# Patient Record
Sex: FEMALE | Race: ASIAN | NOT HISPANIC OR LATINO | Employment: OTHER | URBAN - METROPOLITAN AREA
[De-identification: names, ages, dates, MRNs, and addresses within clinical notes are randomized per-mention and may not be internally consistent; named-entity substitution may affect disease eponyms.]

---

## 2018-01-29 ENCOUNTER — OFFICE VISIT (OUTPATIENT)
Dept: FAMILY MEDICINE | Facility: CLINIC | Age: 83
End: 2018-01-29
Payer: MEDICARE

## 2018-01-29 ENCOUNTER — HOSPITAL ENCOUNTER (OUTPATIENT)
Dept: RADIOLOGY | Facility: HOSPITAL | Age: 83
Discharge: HOME | End: 2018-01-29
Attending: FAMILY MEDICINE
Payer: MEDICARE

## 2018-01-29 DIAGNOSIS — Z00.00 PREVENTATIVE HEALTH CARE: ICD-10-CM

## 2018-01-29 DIAGNOSIS — R00.0 TACHYCARDIA: ICD-10-CM

## 2018-01-29 DIAGNOSIS — R05.9 COUGH: ICD-10-CM

## 2018-01-29 DIAGNOSIS — I10 ESSENTIAL HYPERTENSION: Primary | ICD-10-CM

## 2018-01-29 DIAGNOSIS — F03.90 DEMENTIA WITHOUT BEHAVIORAL DISTURBANCE, UNSPECIFIED DEMENTIA TYPE: ICD-10-CM

## 2018-01-29 DIAGNOSIS — N39.0 RECURRENT UTI: ICD-10-CM

## 2018-01-29 LAB
BILIRUB UR QL STRIP: NEGATIVE
CLARITY UR REFRACT.AUTO: CLEAR
COLOR UR AUTO: ABNORMAL
GLUCOSE UR QL STRIP: NEGATIVE
HGB UR QL STRIP: NEGATIVE
HYALINE CASTS UR QL AUTO: 1 /LPF
KETONES UR QL STRIP: NEGATIVE
LEUKOCYTE ESTERASE UR QL STRIP: ABNORMAL
MICROSCOPIC COMMENT: NORMAL
NITRITE UR QL STRIP: NEGATIVE
PH UR STRIP: 6 [PH] (ref 5–8)
PROT UR QL STRIP: NEGATIVE
RBC #/AREA URNS AUTO: 0 /HPF (ref 0–4)
SP GR UR STRIP: 1.01 (ref 1–1.03)
SQUAMOUS #/AREA URNS AUTO: 0 /HPF
URN SPEC COLLECT METH UR: ABNORMAL
UROBILINOGEN UR STRIP-ACNC: NEGATIVE EU/DL
WBC #/AREA URNS AUTO: 2 /HPF (ref 0–5)

## 2018-01-29 PROCEDURE — 99204 OFFICE O/P NEW MOD 45 MIN: CPT | Mod: S$PBB,,, | Performed by: FAMILY MEDICINE

## 2018-01-29 PROCEDURE — 71046 X-RAY EXAM CHEST 2 VIEWS: CPT | Mod: TC,FY,PO

## 2018-01-29 PROCEDURE — 1159F MED LIST DOCD IN RCRD: CPT | Mod: ,,, | Performed by: FAMILY MEDICINE

## 2018-01-29 PROCEDURE — 87086 URINE CULTURE/COLONY COUNT: CPT

## 2018-01-29 PROCEDURE — 81001 URINALYSIS AUTO W/SCOPE: CPT

## 2018-01-29 PROCEDURE — 93005 ELECTROCARDIOGRAM TRACING: CPT | Mod: PBBFAC,PO | Performed by: FAMILY MEDICINE

## 2018-01-29 PROCEDURE — 1126F AMNT PAIN NOTED NONE PRSNT: CPT | Mod: ,,, | Performed by: FAMILY MEDICINE

## 2018-01-29 PROCEDURE — 71046 X-RAY EXAM CHEST 2 VIEWS: CPT | Mod: 26,,, | Performed by: RADIOLOGY

## 2018-01-29 PROCEDURE — 99999 PR PBB SHADOW E&M-NEW PATIENT-LVL IV: CPT | Mod: PBBFAC,,, | Performed by: FAMILY MEDICINE

## 2018-01-29 PROCEDURE — 99204 OFFICE O/P NEW MOD 45 MIN: CPT | Mod: PBBFAC,25,PO | Performed by: FAMILY MEDICINE

## 2018-01-29 PROCEDURE — 93010 ELECTROCARDIOGRAM REPORT: CPT | Mod: ,,, | Performed by: NUCLEAR MEDICINE

## 2018-01-29 RX ORDER — MEMANTINE HYDROCHLORIDE 28 MG/1
CAPSULE, EXTENDED RELEASE ORAL
COMMUNITY
End: 2018-02-05 | Stop reason: SDUPTHER

## 2018-01-29 RX ORDER — AMLODIPINE BESYLATE 5 MG/1
5 TABLET ORAL DAILY
COMMUNITY
End: 2019-02-01 | Stop reason: SDUPTHER

## 2018-01-29 NOTE — PROGRESS NOTES
CHIEF COMPLAINT: This is a 89-year-old female here for first visit to establish care and complaining of persistent cough.    SUBJECTIVE: The patient is brought in by her son and daughter-in-law.  She has a history of essential hypertension for which she takes amlodipine 5 mg daily.  Blood pressure fluctuates and record is brought in, which shows recent normal readings in the last week.  Blood pressure today is 150/86.  Repeated by me 138/80.  Pulse was initially 114.  Repeated by me 92.  Patient has dementia and takes Namenda XR 28 mg daily.  Her family reports that she's had recurrent UTI and is dissatisfied with workup.  Apparently PCP has been treating her with antibiotics for subjective symptoms.  She recently completed a round of Levaquin after a course of cephalexin.  She is incontinent of urine recently and daughter-in-law complains of strong odor.  Patient denies hematuria or dysuria.    Patient was recently ill with upper respiratory infection.  She was given Tamiflu.  Following that, she had wheezing and was given oral steroids.  Patient continues to have cough, but it is much improved.  Her grandson is a cardiologist and requests chest x-ray and EKG.  The patient ambulates with a cane.  She attends to her own bathing and toileting, but does not cook or do housework.  Her financial decisions are made by her family.      Past Medical History:   Diagnosis Date    Dementia     Essential hypertension     Recurrent UTI        Past Surgical History:   Procedure Laterality Date    TOTAL KNEE ARTHROPLASTY Right 2002       Social History     Social History    Marital status:      Spouse name: N/A    Number of children: N/A    Years of education: N/A     Social History Main Topics    Smoking status: Never Smoker    Smokeless tobacco: Never Used    Alcohol use No    Drug use: No    Sexual activity: Not Asked     Other Topics Concern    None     Social History Narrative    She is  and has 3  adult children.  She lives with her son and daughter-in-law.       Family History   Problem Relation Age of Onset    No Known Problems Mother     Other Father      Accident     Dementia Sister     Heart disease Brother     Heart disease Brother     Heart disease Brother     Lumbar disc disease Son          ROS:  GENERAL: Patient denies fever, chills, night sweats.  Patient denies weight gain or loss. Patient denies anorexia, fatigue, weakness or swollen glands.  SKIN: Patient denies rash or hair loss.  HEENT: Patient denies sore throat, ear pain, hearing loss, nasal congestion, or runny nose. Patient denies visual disturbance, eye irritation or discharge.  LUNGS: Patient denies wheeze or hemoptysis.  Positive for cough.  CARDIOVASCULAR: Patient denies chest pain, shortness of breath, palpitations, syncope or lower extremity edema.  GI: Patient denies abdominal pain, nausea, vomiting, diarrhea, constipation, blood in stool or melena.  GENITOURINARY: Patient denies pelvic pain, vaginal discharge, itch or odor. Patient denies irregular vaginal bleeding.  Patient denies dysuria, frequency, hematuria, or urgency.  Positive for urinary incontinence.  BREASTS: Patient denies breast pain, mass or nipple discharge.  MUSCULOSKELETAL: Patient denies joint pain, swelling, redness or warmth.  NEUROLOGIC: Patient denies headache, vertigo, paresthesias, weakness in limb, dysarthria, dysphagia or abnormality of gait.  PSYCHIATRIC: Patient denies anxiety, depression, or memory loss.    OBJECTIVE:   VITAL SIGNS: O2 saturation 97%.  GENERAL: Well-developed well-nourished elderly, slightly overweight female alert and oriented x3, in no acute distress.  Poor historian due to to cognitive impairment.  SKIN: Clear without rash.  Normal color and tone.  HEENT: Eyes: Clear conjunctivae. No scleral icterus.  Pupils equal reactive to light and accommodation.  Ears: Clear canals. Clear TMs.  Nose: Without congestion.  Pharynx: Without  injection or exudates.  NECK: Supple, normal range of motion.  No masses, lymphadenopathy or enlarged thyroid.  No JVD.  Carotids 2+ and equal.  No bruits.  LUNGS: Clear to auscultation.  Normal respiratory effort.  CARDIOVASCULAR:  Regular rhythm with occasional irregular beats, normal S1, S2 without murmur, gallop or rub.  BACK:  No CVA or spinal tenderness.  ABDOMEN: Normal appearance.  Active bowel sounds.  Soft, nontender without mass or organomegaly.  No rebound or guarding.  EXTREMITIES: Without cyanosis, clubbing or edema.  Distal pulses 2+ and equal.  Normal range of motion in all extremities.  No joint effusion, erythema or warmth.  NEUROLOGIC:   Cranial nerves II through XII without deficit.  Motor strength equal bilaterally.  Sensation normal to touch.  Deep tendon reflexes 2+ and equal.  Gait unsteady without support.  No tremor.  Negative cerebellar signs.    EKG: Sinus rhythm.  Right bundle branch block.  Chest x-ray:  Cardiac silhouette appears borderline enlarged.   Diffuse interstitial hazy parenchymal opacities are present throughout the bilateral lungs which may be in part chronic in nature.  No prior exams available for comparison.  Multifocal infectious/inflammatory process or developing pulmonary edema not excluded.  Clinical correlation is recommended. Multilevel degenerative changes noted throughout the visualized spine.    ASSESSMENT:  1. Essential hypertension    2. Dementia without behavioral disturbance, unspecified dementia type    3. Recurrent UTI    4. Cough    5. Tachycardia    6. Preventative health care      PLAN:   1.  Urinalysis and urine culture and sensitivity.  2.  Fasting lab.  3.  Continue amlodipine 5 mg daily.  Monitor blood pressure.  Report readings greater than or equal to 140/90.  4.  Follow-up if no resolution of cough or worsening symptoms.  Consider pulmonary consult.  5.  Refill Namenda.  6.  Follow-up in 6 months.

## 2018-01-30 ENCOUNTER — TELEPHONE (OUTPATIENT)
Dept: FAMILY MEDICINE | Facility: CLINIC | Age: 83
End: 2018-01-30

## 2018-01-31 LAB — BACTERIA UR CULT: NORMAL

## 2018-02-04 VITALS
SYSTOLIC BLOOD PRESSURE: 138 MMHG | WEIGHT: 112.88 LBS | HEIGHT: 55 IN | RESPIRATION RATE: 18 BRPM | BODY MASS INDEX: 26.12 KG/M2 | DIASTOLIC BLOOD PRESSURE: 80 MMHG | OXYGEN SATURATION: 97 % | HEART RATE: 92 BPM | TEMPERATURE: 99 F

## 2018-02-05 ENCOUNTER — PATIENT MESSAGE (OUTPATIENT)
Dept: FAMILY MEDICINE | Facility: CLINIC | Age: 83
End: 2018-02-05

## 2018-02-05 RX ORDER — MEMANTINE HYDROCHLORIDE 28 MG/1
1 CAPSULE, EXTENDED RELEASE ORAL DAILY
Qty: 90 CAPSULE | Refills: 1 | Status: SHIPPED | OUTPATIENT
Start: 2018-02-05 | End: 2018-08-03 | Stop reason: SDUPTHER

## 2018-08-03 RX ORDER — MEMANTINE HYDROCHLORIDE 28 MG/1
1 CAPSULE, EXTENDED RELEASE ORAL DAILY
Qty: 90 CAPSULE | Refills: 1 | Status: SHIPPED | OUTPATIENT
Start: 2018-08-03 | End: 2019-02-01 | Stop reason: SDUPTHER

## 2018-08-03 NOTE — TELEPHONE ENCOUNTER
----- Message from Vicky Chapito sent at 8/3/2018  9:42 AM CDT -----  Contact: pt   1. What is the name of the medication you are requesting? memantine (NAMENDA XR)        2. What is the dose? 28 mg CSpX  3. How do you take the medication? Orally, topically, etc?oral   4. How often do you take this medication? daily  5. Do you need a 30 day or 90 day supply? 30  6. How many refills are you requesting? 1  7. What is your preferred pharmacy and location of the pharmacy?   [facility] [address] [place] [address] [place] [id number] [facility]  [address] [address]  Phone: [phone] Fax: [phone]  8. Who can we contact with further questions? the patient

## 2019-01-22 ENCOUNTER — OFFICE VISIT (OUTPATIENT)
Dept: FAMILY MEDICINE | Facility: CLINIC | Age: 84
End: 2019-01-22
Payer: MEDICARE

## 2019-01-22 VITALS
WEIGHT: 112.88 LBS | SYSTOLIC BLOOD PRESSURE: 130 MMHG | HEIGHT: 55 IN | BODY MASS INDEX: 26.12 KG/M2 | DIASTOLIC BLOOD PRESSURE: 86 MMHG | TEMPERATURE: 98 F | HEART RATE: 88 BPM | OXYGEN SATURATION: 97 %

## 2019-01-22 DIAGNOSIS — I10 ESSENTIAL HYPERTENSION: ICD-10-CM

## 2019-01-22 DIAGNOSIS — F03.90 DEMENTIA WITHOUT BEHAVIORAL DISTURBANCE, UNSPECIFIED DEMENTIA TYPE: Primary | ICD-10-CM

## 2019-01-22 DIAGNOSIS — R44.3 HALLUCINATIONS: ICD-10-CM

## 2019-01-22 PROCEDURE — 99214 OFFICE O/P EST MOD 30 MIN: CPT | Mod: S$PBB,,, | Performed by: FAMILY MEDICINE

## 2019-01-22 PROCEDURE — 99999 PR PBB SHADOW E&M-EST. PATIENT-LVL III: ICD-10-PCS | Mod: PBBFAC,,, | Performed by: FAMILY MEDICINE

## 2019-01-22 PROCEDURE — 99213 OFFICE O/P EST LOW 20 MIN: CPT | Mod: PBBFAC,PO | Performed by: FAMILY MEDICINE

## 2019-01-22 PROCEDURE — 99214 PR OFFICE/OUTPT VISIT, EST, LEVL IV, 30-39 MIN: ICD-10-PCS | Mod: S$PBB,,, | Performed by: FAMILY MEDICINE

## 2019-01-22 PROCEDURE — 99999 PR PBB SHADOW E&M-EST. PATIENT-LVL III: CPT | Mod: PBBFAC,,, | Performed by: FAMILY MEDICINE

## 2019-01-22 RX ORDER — FERROUS SULFATE, DRIED 160(50) MG
1 TABLET, EXTENDED RELEASE ORAL 2 TIMES DAILY WITH MEALS
COMMUNITY

## 2019-01-22 RX ORDER — DONEPEZIL HYDROCHLORIDE 5 MG/1
5 TABLET, FILM COATED ORAL NIGHTLY
Qty: 30 TABLET | Refills: 3 | Status: SHIPPED | OUTPATIENT
Start: 2019-01-22 | End: 2020-07-17

## 2019-01-22 NOTE — PROGRESS NOTES
CHIEF COMPLAINT:  This is a 89-year-old female here for follow-up dementia.    SUBJECTIVE:  The patient is brought in today by her son and daughter-in-law because of hallucinations.  She carries on conversations while no one is present. Hallucinations are not disruptive or cause behavioral changes.  Patient is currently taking Namenda XR 28 mg daily.  Her son wonders if there is any other medication that would help her but on the other hand does not like to give her medication.  For example she has hypertension and is supposed to take amlodipine 5 mg daily but he gives the medication intermittently.  Blood pressure today is 130/86.  The patient ambulates with a cane.  She attends to her own bathing and toileting, but does not cook or do housework.  Her financial decisions are made by her family.    ROS:  GENERAL: Patient denies fever, chills, night sweats.  Patient denies weight gain or loss. Patient denies anorexia, fatigue, weakness or swollen glands.  SKIN: Patient denies rash or hair loss.  HEENT: Patient denies sore throat, ear pain, hearing loss, nasal congestion, or runny nose. Patient denies visual disturbance, eye irritation or discharge.  LUNGS: Patient denies wheeze or hemoptysis.  Positive for cough.  CARDIOVASCULAR: Patient denies chest pain, shortness of breath, palpitations, syncope or lower extremity edema.  GI: Patient denies abdominal pain, nausea, vomiting, diarrhea, constipation, blood in stool or melena.  GENITOURINARY: Patient denies pelvic pain, vaginal discharge, itch or odor. Patient denies irregular vaginal bleeding.  Patient denies dysuria, frequency, hematuria, or urgency.  Positive for urinary incontinence.  BREASTS: Patient denies breast pain, mass or nipple discharge.  MUSCULOSKELETAL: Patient denies joint pain, swelling, redness or warmth.  NEUROLOGIC: Patient denies headache, vertigo, paresthesias, weakness in limb, dysarthria, dysphagia or abnormality of gait.  PSYCHIATRIC: Patient  denies anxiety, depression or insomnia.     OBJECTIVE:   VITAL SIGNS: O2 saturation 97%.  GENERAL: Well-developed well-nourished elderly, slightly overweight female alert and oriented x3, in no acute distress.  Poor historian due to to cognitive impairment.  SKIN: Clear without rash.  Normal color and tone.  HEENT: Eyes: Clear conjunctivae. No scleral icterus.  Pupils equal reactive to light and accommodation.  Ears: Clear canals. Clear TMs.  Nose: Without congestion.  Pharynx: Without injection or exudates.  NECK: Supple, normal range of motion.  No masses, lymphadenopathy or enlarged thyroid.  No JVD.  Carotids 2+ and equal.  No bruits.  LUNGS: Clear to auscultation.  Normal respiratory effort.  CARDIOVASCULAR:  Regular rhythm with occasional irregular beats, normal S1, S2 without murmur, gallop or rub.  BACK:  No CVA or spinal tenderness.  ABDOMEN: Soft, nontender without mass or organomegaly.  No rebound or guarding.  EXTREMITIES: Without cyanosis, clubbing or edema.  Distal pulses 2+ and equal.  Normal range of motion in all extremities.  No joint effusion, erythema or warmth.  NEUROLOGIC:  Gait unsteady without support.  No tremor.      Discussed at length the treatment of dementia and medications used to control disturbances of behavior.  Reviewed potential side effects.    ASSESSMENT:  1. Dementia without behavioral disturbance, unspecified dementia type    2. Hallucinations    3. Essential hypertension      PLAN:   1.  Since patient is having no significant behavioral disturbances while talking to people who are not there, psychotropic medications are not indicated in view of potential side effects.  2.  Start Aricept 5 mg nightly.  Increase dose to 10 mg in 1 month if no side effects.  3.  Reassurance.  4.  Follow up if no improvement or worsening symptoms.    This visit was 30 min, greater than 50% of which involved counseling.

## 2019-01-30 ENCOUNTER — TELEPHONE (OUTPATIENT)
Dept: FAMILY MEDICINE | Facility: CLINIC | Age: 84
End: 2019-01-30

## 2019-01-30 NOTE — TELEPHONE ENCOUNTER
Pt son states that his mother is having muscle pain in her back and increased confusion with taking Aricept. Pt son notified to stop medication until further directions are given from provider. Please advise.

## 2019-01-30 NOTE — TELEPHONE ENCOUNTER
----- Message from Afshan Juarez sent at 1/30/2019  1:25 PM CST -----  Contact: Patient's son, Ace Bello needs to speak to nurse about patients medication, stating she is having some effects of medication, please call back at [phone]. Thank you

## 2019-01-31 ENCOUNTER — PATIENT MESSAGE (OUTPATIENT)
Dept: FAMILY MEDICINE | Facility: CLINIC | Age: 84
End: 2019-01-31

## 2019-01-31 NOTE — TELEPHONE ENCOUNTER
Spoke with son and advised to discontinue medications and discuss at appt next week. Verbalized understanding. Call ended well.

## 2019-02-01 RX ORDER — AMLODIPINE BESYLATE 5 MG/1
5 TABLET ORAL DAILY
Qty: 30 TABLET | Refills: 5 | Status: SHIPPED | OUTPATIENT
Start: 2019-02-01 | End: 2019-11-21 | Stop reason: SDUPTHER

## 2019-02-01 RX ORDER — MEMANTINE HYDROCHLORIDE 28 MG/1
1 CAPSULE, EXTENDED RELEASE ORAL DAILY
Qty: 90 CAPSULE | Refills: 1 | Status: SHIPPED | OUTPATIENT
Start: 2019-02-01 | End: 2019-07-29 | Stop reason: SDUPTHER

## 2019-02-08 ENCOUNTER — HOSPITAL ENCOUNTER (OUTPATIENT)
Dept: RADIOLOGY | Facility: HOSPITAL | Age: 84
Discharge: HOME | End: 2019-02-08
Attending: FAMILY MEDICINE
Payer: MEDICARE

## 2019-02-08 ENCOUNTER — OFFICE VISIT (OUTPATIENT)
Dept: FAMILY MEDICINE | Facility: CLINIC | Age: 84
End: 2019-02-08
Payer: MEDICARE

## 2019-02-08 VITALS
HEIGHT: 55 IN | DIASTOLIC BLOOD PRESSURE: 108 MMHG | BODY MASS INDEX: 27.73 KG/M2 | RESPIRATION RATE: 18 BRPM | HEART RATE: 64 BPM | TEMPERATURE: 98 F | OXYGEN SATURATION: 97 % | SYSTOLIC BLOOD PRESSURE: 162 MMHG

## 2019-02-08 DIAGNOSIS — S39.92XA INJURY OF BACK, INITIAL ENCOUNTER: ICD-10-CM
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
## 2019-02-08 DIAGNOSIS — S39.92XA INJURY OF BACK, INITIAL ENCOUNTER: Primary | ICD-10-CM

## 2019-02-08 DIAGNOSIS — S89.92XA INJURY OF LEFT KNEE, INITIAL ENCOUNTER: ICD-10-CM

## 2019-02-08 DIAGNOSIS — S79.912A INJURY OF LEFT HIP, INITIAL ENCOUNTER: ICD-10-CM

## 2019-02-08 DIAGNOSIS — F03.90 DEMENTIA WITHOUT BEHAVIORAL DISTURBANCE, UNSPECIFIED DEMENTIA TYPE: ICD-10-CM

## 2019-02-08 PROBLEM — Z89.622: Status: ACTIVE | Noted: 2019-02-08

## 2019-02-08 PROCEDURE — 73560 XR KNEE ORTHO LEFT: ICD-10-PCS | Mod: 26,59,LT, | Performed by: RADIOLOGY

## 2019-02-08 PROCEDURE — 72070 X-RAY EXAM THORAC SPINE 2VWS: CPT | Mod: TC,FY,PO

## 2019-02-08 PROCEDURE — 99999 PR PBB SHADOW E&M-EST. PATIENT-LVL IV: CPT | Mod: PBBFAC,,, | Performed by: FAMILY MEDICINE

## 2019-02-08 PROCEDURE — 73502 XR HIP 2 VIEW LEFT: ICD-10-PCS | Mod: 26,LT,, | Performed by: RADIOLOGY

## 2019-02-08 PROCEDURE — 73562 XR KNEE ORTHO LEFT: ICD-10-PCS | Mod: 26,LT,, | Performed by: RADIOLOGY

## 2019-02-08 PROCEDURE — 99214 PR OFFICE/OUTPT VISIT, EST, LEVL IV, 30-39 MIN: ICD-10-PCS | Mod: S$PBB,,, | Performed by: FAMILY MEDICINE

## 2019-02-08 PROCEDURE — 73560 X-RAY EXAM OF KNEE 1 OR 2: CPT | Mod: TC,FY,PO,LT

## 2019-02-08 PROCEDURE — 99214 OFFICE O/P EST MOD 30 MIN: CPT | Mod: PBBFAC,25,PO | Performed by: FAMILY MEDICINE

## 2019-02-08 PROCEDURE — 72070 X-RAY EXAM THORAC SPINE 2VWS: CPT | Mod: 26,,, | Performed by: RADIOLOGY

## 2019-02-08 PROCEDURE — 73560 X-RAY EXAM OF KNEE 1 OR 2: CPT | Mod: 26,59,LT, | Performed by: RADIOLOGY

## 2019-02-08 PROCEDURE — 99999 PR PBB SHADOW E&M-EST. PATIENT-LVL IV: ICD-10-PCS | Mod: PBBFAC,,, | Performed by: FAMILY MEDICINE

## 2019-02-08 PROCEDURE — 72070 XR THORACIC SPINE AP LATERAL: ICD-10-PCS | Mod: 26,,, | Performed by: RADIOLOGY

## 2019-02-08 PROCEDURE — 73502 X-RAY EXAM HIP UNI 2-3 VIEWS: CPT | Mod: 26,LT,, | Performed by: RADIOLOGY

## 2019-02-08 PROCEDURE — 73502 X-RAY EXAM HIP UNI 2-3 VIEWS: CPT | Mod: TC,FY,PO,LT

## 2019-02-08 PROCEDURE — 99214 OFFICE O/P EST MOD 30 MIN: CPT | Mod: S$PBB,,, | Performed by: FAMILY MEDICINE

## 2019-02-08 PROCEDURE — 73562 X-RAY EXAM OF KNEE 3: CPT | Mod: 26,LT,, | Performed by: RADIOLOGY

## 2019-02-08 RX ORDER — DICLOFENAC SODIUM 10 MG/G
2 GEL TOPICAL 4 TIMES DAILY
Qty: 1 TUBE | Refills: 5 | Status: SHIPPED | OUTPATIENT
Start: 2019-02-08 | End: 2020-05-26

## 2019-02-08 NOTE — PROGRESS NOTES
CHIEF COMPLAINT:  This is a 89-year-old female complaining of injury sustained in a fall.    SUBJECTIVE:  The patient is brought in by her son and daughter-in-law.  She fell yesterday onto the bathroom floor during the night when she got out of bed on her own.  Since that time, she has been complaining of pain in her midback.  She is also having difficulty bearing weight on her left leg.  Patient had 2 prior falls onto the carpet preceding yesterday's fall.  Her son is concerned about her getting out of bed on her own during the night.  He and his wife are not able to sleep because of the situation.  He requests a hospital bed with railing.  The patient has dementia.  She carries on conversations while no one is present. Hallucinations are not disruptive or cause behavioral changes.  Patient is currently taking Namenda XR 28 mg daily.  Patient was started on Aricept 5 mg approximately 2-1/2 weeks ago but was unable to tolerate medication.  Her family states that she became more confused and complained of back pain so Aricept was discontinued. Patient has been given Tylenol extra-strength with some relief.    ROS:  GENERAL: Patient denies fever, chills, night sweats.  Patient denies weight gain or loss. Patient denies anorexia, fatigue, weakness or swollen glands.  SKIN: Patient denies rash or hair loss.  HEENT: Patient denies sore throat, ear pain, hearing loss, nasal congestion, or runny nose. Patient denies visual disturbance, eye irritation or discharge.  LUNGS: Patient denies cough, wheeze or hemoptysis.    CARDIOVASCULAR: Patient denies chest pain, shortness of breath, palpitations, syncope or lower extremity edema.  GI: Patient denies abdominal pain, nausea, vomiting, diarrhea, constipation, blood in stool or melena.  GENITOURINARY: Patient denies dysuria, frequency, hematuria, or urgency.  Positive for urinary incontinence.  MUSCULOSKELETAL: Patient denies joint pain, swelling, redness or warmth.  NEUROLOGIC:  Patient denies headache, vertigo, paresthesias, weakness in limb, dysarthria, dysphagia or abnormality of gait.  PSYCHIATRIC: Patient denies anxiety, depression or insomnia.     OBJECTIVE:   GENERAL: Well-developed well-nourished elderly, slightly overweight female alert and oriented x3, in no acute distress.  Poor historian due to to cognitive impairment.  SKIN: Clear without rash.  Normal color and tone.  No abrasions or contusions noted.  HEENT: Eyes: Clear conjunctivae. No scleral icterus.  Pupils equal reactive to light and accommodation.  Ears: Clear canals. Clear TMs.  Nose: Without congestion.  Pharynx: Without injection or exudates.  NECK: Supple, normal range of motion.  No masses, lymphadenopathy or enlarged thyroid.  No JVD.  Carotids 2+ and equal.  No bruits.  LUNGS: Clear to auscultation.  Normal respiratory effort.  CARDIOVASCULAR:  Regular rhythm with occasional irregular beats, normal S1, S2 without murmur, gallop or rub.  BACK:  No CVA or spinal tenderness. No paraspinous muscle tenderness.  ABDOMEN: Soft, nontender without mass or organomegaly.  No rebound or guarding.  EXTREMITIES: Without cyanosis, clubbing or edema.  Distal pulses 2+ and equal.  No joint effusion, erythema or warmth.  NEUROLOGIC:  Patient unable to stand on both legs even with assistance.  Gait unsteady without support.  No tremor.      X-ray thoracic spine:  There are multilevel mild age-indeterminate compression fracture deformities throughout the thoracic spine with involvement of the T4 and T9 through T12 levels.  Multilevel spondylosis and mild disc height loss is present throughout the thoracic spine as well as the visualized cervical spine.  There is mild S shaped thoracolumbar scoliosis with convexity of the thoracic spine to the right.  No spondylolisthesis demonstrated.  Generalized osteopenia noted.  X-ray left knee:  There is no radiographic evidence of acute osseous, articular, or soft tissue abnormality.  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  Prior right knee arthroplasty noted.  X-ray left hip:  There is no radiographic evidence of acute osseous, articular, or soft tissue abnormality.  Hip joint spaces are preserved.  Degenerative findings noted in the visualized lower lumbar spine and bilateral SI joints.  Visualized osseous structures are diffusely osteopenic.    ASSESSMENT:  1. Injury of back, initial encounter    2. Injury of left knee, initial encounter    3. Injury of left hip, initial encounter    4. Fall, initial encounter    5. Dementia without behavioral disturbance, unspecified dementia type      PLAN:   1.  Apply moist heat and/or ice q.i.d. for 15-20 minutes.  2.  Continue Tylenol extra-strength 2 tablets up to 4 times daily.  3.  May add Aleve 1 tablet 3 times daily.  4.  Home health evaluation ordered.

## 2019-02-12 ENCOUNTER — TELEPHONE (OUTPATIENT)
Dept: FAMILY MEDICINE | Facility: CLINIC | Age: 84
End: 2019-02-12

## 2019-02-12 NOTE — TELEPHONE ENCOUNTER
----- Message from Maya Stephenson sent at 2/12/2019  9:25 AM CST -----  Contact: [facility] (Sophia)  Please give Sophia a call at [phone] she will not be able to set anything up with pt until she speak with a nurse

## 2019-02-12 NOTE — TELEPHONE ENCOUNTER
Sophia br[facility] stated that they need the home health order to state what services is needed from them Speech therapy and physical therapy

## 2019-02-14 ENCOUNTER — TELEPHONE (OUTPATIENT)
Dept: FAMILY MEDICINE | Facility: CLINIC | Age: 84
End: 2019-02-14

## 2019-02-14 DIAGNOSIS — R29.6 FALLING: Primary | ICD-10-CM

## 2019-02-14 DIAGNOSIS — R29.898 WEAKNESS OF LOWER EXTREMITY, UNSPECIFIED LATERALITY: ICD-10-CM

## 2019-02-14 DIAGNOSIS — S39.92XA INJURY OF BACK, INITIAL ENCOUNTER: ICD-10-CM
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
## 2019-02-14 NOTE — TELEPHONE ENCOUNTER
----- Message from Marlin Uriarte sent at 2/14/2019 11:01 AM CST -----  Contact: [facility]  Type:  Needs Medical Advice    Who Called: Phyllis  Symptoms (please be specific): n/a  How long has patient had these symptoms: n/a  Pharmacy name and phone #:  n/a  Would the patient rather a call back or a response via [facility]? Call back   Best Call Back Number: Please call her at [phone]  Additional Information: Regards to getting orders for Home Health

## 2019-02-14 NOTE — TELEPHONE ENCOUNTER
[facility] states that they need pt's homehealth order to state physical therapy evaluation

## 2019-02-20 ENCOUNTER — TELEPHONE (OUTPATIENT)
Dept: FAMILY MEDICINE | Facility: CLINIC | Age: 84
End: 2019-02-20

## 2019-02-20 NOTE — TELEPHONE ENCOUNTER
----- Message from Lisa Orta sent at 2/20/2019 10:28 AM CST -----  Contact: [facility]  Type:  Needs Medical Advice    Who Called: Kathy  Symptoms (please be specific): n/a  How long has patient had these symptoms:  n/a  Pharmacy name and phone #:  n/a  Would the patient rather a call back or a response via [facility]?  Call back  Best Call Back Number:  [phone]  Additional Information: [facility] called to speak with the nurse regarding the hospital bed.    Thanks,  Lisa

## 2019-02-22 ENCOUNTER — TELEPHONE (OUTPATIENT)
Dept: FAMILY MEDICINE | Facility: CLINIC | Age: 84
End: 2019-02-22

## 2019-02-22 NOTE — TELEPHONE ENCOUNTER
Spoke with Martita from [facility], let her know to re fax the forms over. Martita verbalized understanding.

## 2019-02-22 NOTE — TELEPHONE ENCOUNTER
----- Message from Vicky Uriarte sent at 2/22/2019 10:42 AM CST -----  [facility] is requesting a call back regarding pt. Caller didn't state the reason for the call.    [phone]

## 2019-02-22 NOTE — TELEPHONE ENCOUNTER
----- Message from Kiera Medina sent at 2/22/2019  3:09 PM CST -----  Contact: [facility]  Caller has questions regarding the order that was faxed, caller states she faxed detailed prescriptions to be signed only one was signed also need a justification for the bed (why pt needs and how would she benefit ) ... Call back number: [phone]// fax number: [phone]

## 2019-02-25 ENCOUNTER — TELEPHONE (OUTPATIENT)
Dept: FAMILY MEDICINE | Facility: CLINIC | Age: 84
End: 2019-02-25

## 2019-02-25 NOTE — TELEPHONE ENCOUNTER
Spoke with Martiat [facility] and informed her to refax forms to the office to have signed with understanding.

## 2019-02-27 ENCOUNTER — TELEPHONE (OUTPATIENT)
Dept: INTERNAL MEDICINE | Facility: CLINIC | Age: 84
End: 2019-02-27

## 2019-02-27 NOTE — TELEPHONE ENCOUNTER
Spoke with Nikki, let her know to contact the office again tomorrow when 's staff is back in because the forms she is needed were faxed over multiple times.

## 2019-02-27 NOTE — TELEPHONE ENCOUNTER
----- Message from Patricia López sent at 2/27/2019  1:12 PM CST -----  Nikki ( [facility] ) is requesting an justification on hospital bed order.            Please call Nikki ( [facility] ) [phone]

## 2019-03-05 ENCOUNTER — TELEPHONE (OUTPATIENT)
Dept: ADMINISTRATIVE | Facility: CLINIC | Age: 84
End: 2019-03-05

## 2019-03-05 NOTE — TELEPHONE ENCOUNTER
Home Health SOC 02/14/2019 - 04/14/2019 with [facility] - Dr. Nadira Nunez. Patient received SN and PT services.

## 2019-03-14 ENCOUNTER — TELEPHONE (OUTPATIENT)
Dept: FAMILY MEDICINE | Facility: CLINIC | Age: 84
End: 2019-03-14

## 2019-03-14 NOTE — TELEPHONE ENCOUNTER
Spoke with Kayla from [facility] and she stated that the pt wants to cancel the order for a hospital bed and just wanted us to be informed.

## 2019-03-14 NOTE — TELEPHONE ENCOUNTER
----- Message from Khoi Cespedes sent at 3/14/2019 10:39 AM CDT -----  [phone]  Would like to follow-up with nurse regarding request for clinical notes for hospital bed.  Please call back at [phone].  Md Edgard

## 2019-07-29 RX ORDER — MEMANTINE HYDROCHLORIDE 28 MG/1
CAPSULE, EXTENDED RELEASE ORAL
Qty: 90 CAPSULE | Refills: 1 | Status: SHIPPED | OUTPATIENT
Start: 2019-07-29 | End: 2020-01-24

## 2019-11-21 RX ORDER — AMLODIPINE BESYLATE 5 MG/1
TABLET ORAL
Qty: 90 TABLET | Refills: 0 | Status: SHIPPED | OUTPATIENT
Start: 2019-11-21 | End: 2020-04-23 | Stop reason: SDUPTHER

## 2020-01-24 RX ORDER — MEMANTINE HYDROCHLORIDE 28 MG/1
CAPSULE, EXTENDED RELEASE ORAL
Qty: 90 CAPSULE | Refills: 0 | Status: SHIPPED | OUTPATIENT
Start: 2020-01-24 | End: 2020-04-23 | Stop reason: SDUPTHER

## 2020-03-30 ENCOUNTER — TELEPHONE (OUTPATIENT)
Dept: FAMILY MEDICINE | Facility: CLINIC | Age: 85
End: 2020-03-30

## 2020-03-30 NOTE — TELEPHONE ENCOUNTER
Appt scheduled 6 weeks out with Kandis  ----- Message from Florecita Becker sent at 3/30/2020  2:42 PM CDT -----  Contact: Kandis Bhandari  States she's calling regarding an appt for an assessment for a nursing home. Please call Kandis Bhandari [phone]. Thank you

## 2020-04-23 RX ORDER — AMLODIPINE BESYLATE 5 MG/1
5 TABLET ORAL DAILY
Qty: 90 TABLET | Refills: 0 | Status: SHIPPED | OUTPATIENT
Start: 2020-04-23 | End: 2020-07-17 | Stop reason: SDUPTHER

## 2020-04-23 RX ORDER — MEMANTINE HYDROCHLORIDE 28 MG/1
1 CAPSULE, EXTENDED RELEASE ORAL DAILY
Qty: 90 CAPSULE | Refills: 0 | Status: SHIPPED | OUTPATIENT
Start: 2020-04-23 | End: 2020-07-17 | Stop reason: SDUPTHER

## 2020-05-26 RX ORDER — DICLOFENAC SODIUM 10 MG/G
GEL TOPICAL 4 TIMES DAILY PRN
Qty: 100 G | Refills: 0 | Status: SHIPPED | OUTPATIENT
Start: 2020-05-26 | End: 2020-05-28 | Stop reason: SDUPTHER

## 2020-05-28 ENCOUNTER — PATIENT MESSAGE (OUTPATIENT)
Dept: FAMILY MEDICINE | Facility: CLINIC | Age: 85
End: 2020-05-28

## 2020-05-28 RX ORDER — DICLOFENAC SODIUM 10 MG/G
GEL TOPICAL 4 TIMES DAILY PRN
Qty: 100 G | Refills: 0 | Status: SHIPPED | OUTPATIENT
Start: 2020-05-28

## 2020-06-12 ENCOUNTER — TELEPHONE (OUTPATIENT)
Dept: FAMILY MEDICINE | Facility: CLINIC | Age: 85
End: 2020-06-12

## 2020-06-12 NOTE — TELEPHONE ENCOUNTER
Incontinence is very common in the elderly especially those with cognitive impairment. Medication can sometimes help with an overactive bladder but can have side effects.  Also I have not seen patient in 16 months and will not call in medication without evaluation.  Can be virtual.

## 2020-06-12 NOTE — TELEPHONE ENCOUNTER
Pt's son states that pt is experiencing bowel and urine incontinence for a couple of weeks now and wants to know if that is normal for a 89 year old Dementia pt or is there something that she can take for it bc it is happening 4 to 5 times a day.

## 2020-06-12 NOTE — TELEPHONE ENCOUNTER
----- Message from Madiha Rivera sent at 6/12/2020 12:07 PM CDT -----  Contact: Patient's son- Kandis  Please call concerning patient's constipation. Please call at   [phone] (work)

## 2020-06-16 NOTE — TELEPHONE ENCOUNTER
Spoke with pt's daughter in law about Dr. Nunez's response and she stated that is what she wanted to know and stated that she will call later to schedule a virtual visit with Dr. Nunez if she feels that she needs to.

## 2020-07-17 ENCOUNTER — OFFICE VISIT (OUTPATIENT)
Dept: FAMILY MEDICINE | Facility: CLINIC | Age: 85
End: 2020-07-17
Payer: MEDICARE

## 2020-07-17 VITALS
BODY MASS INDEX: 25.92 KG/M2 | HEART RATE: 84 BPM | SYSTOLIC BLOOD PRESSURE: 137 MMHG | DIASTOLIC BLOOD PRESSURE: 74 MMHG | HEIGHT: 55 IN | WEIGHT: 112 LBS

## 2020-07-17 DIAGNOSIS — I10 ESSENTIAL HYPERTENSION: ICD-10-CM

## 2020-07-17 DIAGNOSIS — Z71.89 COMPLEX CARE COORDINATION: ICD-10-CM

## 2020-07-17 DIAGNOSIS — F02.80 LATE ONSET ALZHEIMER'S DISEASE WITHOUT BEHAVIORAL DISTURBANCE: Primary | ICD-10-CM

## 2020-07-17 DIAGNOSIS — G30.1 LATE ONSET ALZHEIMER'S DISEASE WITHOUT BEHAVIORAL DISTURBANCE: Primary | ICD-10-CM

## 2020-07-17 PROCEDURE — 99214 PR OFFICE/OUTPT VISIT, EST, LEVL IV, 30-39 MIN: ICD-10-PCS | Mod: 95,,, | Performed by: FAMILY MEDICINE

## 2020-07-17 PROCEDURE — 99214 OFFICE O/P EST MOD 30 MIN: CPT | Mod: 95,,, | Performed by: FAMILY MEDICINE

## 2020-07-17 RX ORDER — MEMANTINE HYDROCHLORIDE 28 MG/1
28 CAPSULE, EXTENDED RELEASE ORAL DAILY
Qty: 90 CAPSULE | Refills: 3 | Status: SHIPPED | OUTPATIENT
Start: 2020-07-17

## 2020-07-17 RX ORDER — AMLODIPINE BESYLATE 5 MG/1
5 TABLET ORAL DAILY
Qty: 90 TABLET | Refills: 3 | Status: SHIPPED | OUTPATIENT
Start: 2020-07-17

## 2020-07-17 NOTE — PROGRESS NOTES
The patient location is: At home  The chief complaint leading to consultation is:  Follow-up chronic medical conditions.    Visit type:  Audiovisual    Face to Face time with patient:  18 min  25 minutes of total time spent on the encounter, which includes face to face time and non-face to face time preparing to see the patient (eg, review of tests), Obtaining and/or reviewing separately obtained history, Documenting clinical information in the electronic or other health record, Independently interpreting results (not separately reported) and communicating results to the patient/family/caregiver, or Care coordination (not separately reported).     Each patient to whom he or she provides medical services by telemedicine is:  (1) informed of the relationship between the physician and patient and the respective role of any other health care provider with respect to management of the patient; and (2) notified that he or she may decline to receive medical services by telemedicine and may withdraw from such care at any time.      CHIEF COMPLAINT:  This is a 89-year-old female here for follow-up chronic medical conditions.      SUBJECTIVE:   The patient is accompanied by her son and daughter-in-law today who provide all of the history.  The patient has dementia for which she takes Namenda XR 28 mg daily.  She was not able to tolerate Aricept.  Patient's family reports that cognitive function is stable.  She has episodes of confusion and hallucinations intermittently.  She sometimes carries on conversations with no on else present. Hallucinations are not disruptive nor do they cause behavioral changes.  The patient has essential hypertension and is taking amlodipine 5 mg 1/2 tablet daily.  Blood pressure today is 137/74.  The patient is ambulating less but when she does so uses a walker.  She is able to stand on her own. She needs helps with bathing and toileting.  She needs to be reminded to eat; she eats well but slowly.   She sleeps well at night and naps twice during the day.  Her financial decisions are made by her family.       ROS:  GENERAL: Patient denies fever, chills, night sweats.  Patient denies weight gain or loss. Patient denies anorexia, fatigue, weakness or swollen glands.  SKIN: Patient denies rash or hair loss.  HEENT: Patient denies sore throat, ear pain, hearing loss, nasal congestion, or runny nose. Patient denies visual disturbance, eye irritation or discharge.  LUNGS: Patient denies wheeze or hemoptysis.  Positive for cough.  CARDIOVASCULAR: Patient denies chest pain, shortness of breath, palpitations, syncope or lower extremity edema.  GI: Patient denies abdominal pain, nausea, vomiting, diarrhea, constipation, blood in stool or melena.  GENITOURINARY: Patient denies pelvic pain, vaginal discharge, itch or odor. Patient denies irregular vaginal bleeding.  Patient denies dysuria, frequency, hematuria, or urgency.  Positive for urinary incontinence.  BREASTS: Patient denies breast pain, mass or nipple discharge.  MUSCULOSKELETAL: Patient denies joint pain, swelling, redness or warmth.  NEUROLOGIC: Patient denies headache, vertigo, paresthesias, weakness in limb, dysarthria, dysphagia or abnormality of gait.  PSYCHIATRIC: Patient denies anxiety, depression or insomnia.  Positive for dementia.     OBJECTIVE:   VITAL SIGNS:  Blood pressure 137/74.  Pulse 84 and regular.  GENERAL: Well-developed well-nourished elderly, slightly overweight female alert and oriented x3, in no acute distress.  Unable to give history due to to cognitive impairment.  No obvious hallucinations, delusions or flight of ideas.  SKIN: Clear without rash.  Normal color and tone.  HEENT: Eyes: Clear conjunctivae. No scleral icterus.     HEENT: Eyes: Clear conjunctivae. No scleral icterus.  Nose:  Not congested-sounding.    NECK: Supple, normal range of motion.  LUNGS:  Normal respiratory effort.  No audible wheezing.  EXTREMITIES:  Normal range of  motion in all extremities.  NEUROLOGIC:  Gait without abnormality.  No tremor.     ASSESSMENT:  1. Late onset Alzheimer's disease without behavioral disturbance    2. Essential hypertension        PLAN:  1.  Stay active.  2.  Age-appropriate counseling.  3.  Refill amlodipine and Namenda.  4.  Follow-up in 3-6 months.    This visit was 25 min greater than 50% of which involved counseling.    This note is generated with speech recognition software and is subject to transcription error and sound alike phrases that may be missed by proofreading.

## 2020-08-11 DIAGNOSIS — G30.1 LATE ONSET ALZHEIMER'S DISEASE WITHOUT BEHAVIORAL DISTURBANCE: Primary | ICD-10-CM

## 2020-08-11 DIAGNOSIS — F02.80 LATE ONSET ALZHEIMER'S DISEASE WITHOUT BEHAVIORAL DISTURBANCE: Primary | ICD-10-CM

## 2021-02-13 ENCOUNTER — PATIENT MESSAGE (OUTPATIENT)
Dept: ADMINISTRATIVE | Facility: CLINIC | Age: 86
End: 2021-02-13

## 2021-03-09 ENCOUNTER — PES CALL (OUTPATIENT)
Dept: ADMINISTRATIVE | Facility: CLINIC | Age: 86
End: 2021-03-09

## 2021-04-09 ENCOUNTER — DOCUMENTATION ONLY (OUTPATIENT)
Dept: FAMILY MEDICINE | Facility: CLINIC | Age: 86
End: 2021-04-09

## 2021-04-09 ENCOUNTER — PES CALL (OUTPATIENT)
Dept: ADMINISTRATIVE | Facility: CLINIC | Age: 86
End: 2021-04-09
